# Patient Record
Sex: MALE | Race: WHITE | Employment: STUDENT
[De-identification: names, ages, dates, MRNs, and addresses within clinical notes are randomized per-mention and may not be internally consistent; named-entity substitution may affect disease eponyms.]

---
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
## 2025-07-22 NOTE — LETTER
[id number] [facility]  [address]  [address] [facility] [address]  Phone: [phone]  Fax: [phone]    Jimena Jones MD      January 21, 2021     MIKE Gallego CNP  [address] [address]    Patient: Kuldeep Morris  MR Number: [id number]  YOB: 2005  Date of Visit: 1/20/2021    Dear Dr. Lore Perez:    Thank you for the request for consultation for [facility] to me for the evaluation of cardiac complications related to COVID-19 . Below are the relevant portions of my assessment and plan of care. CHIEF COMPLAINT: Kuldeep Morris is a 13 y.o. male who was seen at the request of MIKE Gallego CNP for evaluation of complications related to COVID-19 on 1/20/2021. HISTORY OF PRESENT ILLNESS:   I had the opportunity to evaluate Kuldeep Morris for an initial consultation per your request in the pediatric cardiology clinic on 1/20/2021. As you know, Viktor Rivers is a 13 y.o. male who was accompanied by his mother for evaluation of complications related to COVID-19. According to Viktor Rivers and his mother, he was diagnosed with COVID-19 by the end of Dec. However, he only had mild symptoms  including sore throat, congestion and cough that have resolved. He is going to participate sports-Wrestling, cardiac clearance is requested. Otherwise, he hasn't had other symptoms referable to the cardiovascular systems, such as difficulty breathing, diaphoresis, chest pain, intolerance to exercise or activities, palpitations, premature fatigue, lethargy, cyanosis and syncope, etc.  His weight and developmental milestones are appropriate for his age. PAST MEDICAL HISTORY:  Negative for chronic illnesses or surgical interventions. He has no known drug allergies.     Past Medical History:   Diagnosis Date    ADHD (attention deficit hyperactivity disorder) 2013    Dr. Gabby Gonzalez diagnosis    Cough 2006 Please check for PA with [facility]. Thank you! Shelby  saw allergist, all normal     Current Outpatient Medications   Medication Sig Dispense Refill    guanFACINE (INTUNIV) 2 MG TB24 extended release tablet       guanFACINE HCl ER 3 MG TB24 TAKE ONE TABLET BY MOUTH NIGHTLY 30 tablet 0    MELATONIN PO Take 6 mg by mouth      brompheniramine-pseudoephedrine-DM 2-30-10 MG/5ML syrup Take 5 mLs by mouth 4 times daily as needed for Congestion or Cough (Patient not taking: Reported on 1/20/2021) 120 mL 0    methylphenidate (METADATE CD) 40 MG extended release capsule Take 1 capsule by mouth every morning for 30 days. 30 capsule 0     No current facility-administered medications for this visit. FAMILY/SOCIAL HISTORY:  Family history is negative for congenital heart disease, arrhythmia, unexplained sudden death at a young age or hypertrophic cardiomyopathy. Socially, the patient lives with his parents and siblings, none of which are acutely ill. He is not exposed to secondhand smoke. He denies caffeine use, smoking, tobacco, pregnancy or illicit/illegal drug use. REVIEW OF SYSTEMS:    Constitutional: Negative  HEENT: Negative  Respiratory: Negative. Cardiovascular: As described in HPI  Gastrointestinal: Negative  Genitourinary: Negative   Musculoskeletal: Negative  Skin: Negative  Neurological: Negative   Hematological: Negative  Psychiatric/Behavioral: Negative  All other systems reviewed and are negative. PHYSICAL EXAMINATION:     Vitals:    01/20/21 1430   BP: 126/42   Site: Right Upper Arm   Position: Sitting   Cuff Size: Medium Adult   Pulse: 67   SpO2: 97%   Weight: (!) 192 lb 11.2 oz (87.4 kg)   Height: 5' 9.13\" (1.756 m)     GENERAL: He appeared well-nourished and well-developed and did not appear to be in pain and in no respiratory or other apparent distress. HEENT: Head was atraumatic and normocephalic. Eyes demonstrated extraocular muscles appeared intact without scleral icterus or nystagmus. ENT demonstrated no rhinorrhea and moist mucosal membranes of the oropharynx with no redness or lesions. The neck did not demonstrate JVD. The thyroid was nonpalpable. CHEST: Chest is symmetric and nontender to palpation. LUNGS: The lungs were clear to auscultation bilaterally with no wheezes, crackles or rhonchi. HEART:  The precordial activity appeared normal.  No thrills or heaves were noted. On auscultation, the patient had normal S1 and S2 with regular rate and rhythm. The second heart sound did split with inspiration. no murmur noted. No gallops, clicks or rubs were heard. Pulses were equal and symmetrical without pulse delay on all extremities. ABDOMEN: The abdomen was soft, nontender, nondistended, with no hepatosplenomegaly. EXTREMITIES: Warm and well-perfused, no clubbing, cyanosis or edema was seen. SKIN: The skin was intact and dry with no rashes or lesions. NEUROLOGY: Neurologic exam is grossly intact. STUDIES:   EKG (1/20/21)  Sinus  Rhythm  -With rate variation   Otherwise, normal EKG     ECHO (1/20/21)   1. Structurally normal heart with normal systolic function. 2. No obvious evidence of congenital cardiac abnormalities. 3. Normal study. Tests performed in the clinic were reviewed and test results discussed with Justin Valentine and Gus's parents. DIAGNOSES:  1. History of COVID-19  2. No cardiac complications related to COVID-19     RECOMMENDATIONS:   1. I discussed this diagnosis at length with the family who demonstrated good understanding   2. No cardiac medication, no activity restriction, and no SBE prophylaxis   3.  Pediatric Cardiology follow up as needed     IMPRESSIONS AND DISCUSSIONS: It is my impression that Rosalinda Collins is a 14 yo male who presents for evaluation of cardiac complications related to COVID-19. Otherwise, he has been hemodynamically stable without symptoms referable to the cardiovascular systems. Based on history, exam, EKG and ECHO, I don't think that he has any cardiac complications related to COVID-19. From cardiac standpoint, he is ok to participate in his sports. Otherwise, my recommendations are listed above. I reviewed today's results with the parents. If he is to develop any cardiac symptoms, Rosalinda Collins is to be seen by his primary care physician and his parent is to notify our office. The parent's questions were answered. They understand and agree with the current medical plan. Thank you for allowing me to participate in the patient's care. Please do not hesitate to contact me with additional questions or concerns in the future.      Sincerely,    Casandra Gonzalez MD & PhD     Pediatric Cardiologist  [facility]  Division of Pediatric Cardiology  [facility]